# Patient Record
Sex: MALE | Race: WHITE | NOT HISPANIC OR LATINO | Employment: OTHER | URBAN - METROPOLITAN AREA
[De-identification: names, ages, dates, MRNs, and addresses within clinical notes are randomized per-mention and may not be internally consistent; named-entity substitution may affect disease eponyms.]

---

## 2020-10-07 ENCOUNTER — TRANSCRIBE ORDERS (OUTPATIENT)
Dept: ADMINISTRATIVE | Facility: HOSPITAL | Age: 55
End: 2020-10-07

## 2020-10-07 DIAGNOSIS — G47.30 SLEEP APNEA: Primary | ICD-10-CM

## 2020-10-14 ENCOUNTER — TRANSCRIBE ORDERS (OUTPATIENT)
Dept: ADMINISTRATIVE | Facility: HOSPITAL | Age: 55
End: 2020-10-14

## 2020-10-14 DIAGNOSIS — R06.81 APNEA: Primary | ICD-10-CM

## 2022-02-22 ENCOUNTER — OFFICE VISIT (OUTPATIENT)
Dept: INTERNAL MEDICINE CLINIC | Facility: OTHER | Age: 57
End: 2022-02-22
Payer: COMMERCIAL

## 2022-02-22 VITALS
HEART RATE: 68 BPM | DIASTOLIC BLOOD PRESSURE: 76 MMHG | RESPIRATION RATE: 18 BRPM | WEIGHT: 184 LBS | SYSTOLIC BLOOD PRESSURE: 116 MMHG | TEMPERATURE: 98.7 F | HEIGHT: 68 IN | BODY MASS INDEX: 27.89 KG/M2 | OXYGEN SATURATION: 98 %

## 2022-02-22 DIAGNOSIS — I10 ESSENTIAL HYPERTENSION: ICD-10-CM

## 2022-02-22 DIAGNOSIS — Z12.5 PROSTATE CANCER SCREENING: ICD-10-CM

## 2022-02-22 DIAGNOSIS — Z11.59 NEED FOR HEPATITIS C SCREENING TEST: Primary | ICD-10-CM

## 2022-02-22 DIAGNOSIS — K21.9 GASTROESOPHAGEAL REFLUX DISEASE WITHOUT ESOPHAGITIS: ICD-10-CM

## 2022-02-22 DIAGNOSIS — F51.01 PRIMARY INSOMNIA: ICD-10-CM

## 2022-02-22 PROCEDURE — 99204 OFFICE O/P NEW MOD 45 MIN: CPT | Performed by: INTERNAL MEDICINE

## 2022-02-22 RX ORDER — LOSARTAN POTASSIUM AND HYDROCHLOROTHIAZIDE 12.5; 5 MG/1; MG/1
1 TABLET ORAL DAILY
COMMUNITY

## 2022-02-22 NOTE — PROGRESS NOTES
Assessment/Plan:    Essential hypertension  Controlled  Continue losartan-hydrochlorothiazide 50-12 5 mg daily  Gastroesophageal reflux disease without esophagitis  Continue follow-up with Gastroenterology  Primary insomnia  Controlled  Continue current regimen  Diagnoses and all orders for this visit:    Need for hepatitis C screening test  -     Hepatitis C antibody; Future    Prostate cancer screening  -     PSA, Total Screen; Future    Essential hypertension  -     CBC; Future  -     Comprehensive metabolic panel; Future  -     Lipid panel; Future    Gastroesophageal reflux disease without esophagitis    Primary insomnia    Other orders  -     losartan-hydrochlorothiazide (HYZAAR) 50-12 5 mg per tablet; Take 1 tablet by mouth daily  -     patient supplied medication; Take 7 5 each by mouth daily at bedtime Zopiclona ( Syrian sleeping pill, can't get in [place] )          BMI Counseling: Body mass index is 28 18 kg/m²  The BMI is above normal  Nutrition recommendations include decreasing portion sizes, encouraging healthy choices of fruits and vegetables, decreasing fast food intake, consuming healthier snacks, limiting drinks that contain sugar, moderation in carbohydrate intake, increasing intake of lean protein, reducing intake of saturated and trans fat and reducing intake of cholesterol  Exercise recommendations include moderate physical activity 150 minutes/week and exercising 3-5 times per week  No pharmacotherapy was ordered  Patient referred to PCP  Rationale for BMI follow-up plan is due to patient being overweight or obese  Depression Screening and Follow-up Plan: Patient was screened for depression during today's encounter  They screened negative with a PHQ-2 score of 0  Subjective:      Patient ID: Jerardo Ro is a 60 y o  male      Chief Complaint   Patient presents with   [facility] maintenance     hep c, phq, bmi adult, bmi f/u plan annual exam       60 year old male is seen today to establish care  He has a PMHx of HTN, GERD, insomnia, and SAMMI  He recently moved from [facility]  He is currently seeing a gastroenterologist for GERD to which he is scheduled to undergo EGD  He had SAMMI however that has resolved  He has a FH pancreatic cancer (mother)  Heartburn  He complains of heartburn  He reports no abdominal pain, no chest pain, no choking, no coughing, no nausea, no sore throat or no wheezing  This is a chronic problem  The current episode started more than 1 year ago  The problem occurs constantly  The problem has been unchanged  Pertinent negatives include no fatigue  He has tried a PPI for the symptoms  The treatment provided moderate relief  Hypertension  This is a chronic problem  The current episode started more than 1 year ago  The problem is unchanged  Pertinent negatives include no chest pain, headaches, palpitations or shortness of breath  Past treatments include diuretics and angiotensin blockers  The current treatment provides moderate improvement  There are no compliance problems  The following portions of the patient's history were reviewed and updated as appropriate: allergies, current medications, past family history, past medical history, past social history, past surgical history and problem list     Review of Systems   Constitutional: Negative for activity change, appetite change, chills, diaphoresis, fatigue and fever  HENT: Negative for congestion, postnasal drip, rhinorrhea, sinus pressure, sinus pain, sneezing and sore throat  Eyes: Negative for visual disturbance  Respiratory: Negative for apnea, cough, choking, chest tightness, shortness of breath and wheezing  Cardiovascular: Negative for chest pain, palpitations and leg swelling  Gastrointestinal: Positive for heartburn  Negative for abdominal distention, abdominal pain, anal bleeding, blood in stool, constipation, diarrhea, nausea and vomiting  Endocrine: Negative for cold intolerance and heat intolerance  Genitourinary: Negative for difficulty urinating, dysuria and hematuria  Musculoskeletal: Negative  Skin: Negative  Neurological: Negative for dizziness, weakness, light-headedness, numbness and headaches  Hematological: Negative for adenopathy  Psychiatric/Behavioral: Negative for agitation, sleep disturbance and suicidal ideas  All other systems reviewed and are negative  Past Medical History:   Diagnosis Date    Hypertension     Sleep apnea          Current Outpatient Medications:     losartan-hydrochlorothiazide (HYZAAR) 50-12 5 mg per tablet, Take 1 tablet by mouth daily, Disp: , Rfl:     patient supplied medication, Take 7 5 each by mouth daily at bedtime Zopiclona ( [place] sleeping pill, can't get in [place] ), Disp: , Rfl:     No Known Allergies    Social History   History reviewed  No pertinent surgical history  Family History   Problem Relation Age of Onset    Pancreatic cancer Mother 58    Diabetes Mother     Heart attack Father 68    Hypertension Father     Hypertension Sister        Objective:  /76 (BP Location: Left arm, Patient Position: Sitting, Cuff Size: Large)   Pulse 68   Temp 98 7 °F (37 1 °C) (Temporal)   Resp 18   Ht 5' 7 75" (1 721 m)   Wt 83 5 kg (184 lb)   SpO2 98%   BMI 28 18 kg/m²     No results found for this or any previous visit (from the past 1344 hour(s))  Physical Exam  Vitals and nursing note reviewed  Constitutional:       General: He is not in acute distress  Appearance: He is well-developed  He is not diaphoretic  HENT:      Head: Normocephalic and atraumatic  Eyes:      General: No scleral icterus  Right eye: No discharge  Left eye: No discharge  Conjunctiva/sclera: Conjunctivae normal       Pupils: Pupils are equal, round, and reactive to light  Neck:      Thyroid: No thyromegaly  Vascular: No JVD     Cardiovascular:      Rate and Rhythm: Normal rate and regular rhythm  Heart sounds: Normal heart sounds  No murmur heard  No friction rub  No gallop  Pulmonary:      Effort: Pulmonary effort is normal  No respiratory distress  Breath sounds: Normal breath sounds  No wheezing or rales  Chest:      Chest wall: No tenderness  Abdominal:      General: Bowel sounds are normal  There is no distension  Palpations: Abdomen is soft  There is no mass  Tenderness: There is no abdominal tenderness  There is no guarding or rebound  Musculoskeletal:         General: No tenderness or deformity  Normal range of motion  Cervical back: Normal range of motion and neck supple  Lymphadenopathy:      Cervical: No cervical adenopathy  Skin:     General: Skin is warm and dry  Coloration: Skin is not pale  Findings: No erythema or rash  Neurological:      Mental Status: He is alert and oriented to person, place, and time  Cranial Nerves: No cranial nerve deficit  Coordination: Coordination normal       Deep Tendon Reflexes: Reflexes are normal and symmetric  Psychiatric:         Behavior: Behavior normal          Thought Content:  Thought content normal          Judgment: Judgment normal

## 2022-03-07 ENCOUNTER — TELEPHONE (OUTPATIENT)
Dept: INTERNAL MEDICINE CLINIC | Facility: OTHER | Age: 57
End: 2022-03-07

## 2022-03-07 DIAGNOSIS — F51.01 PRIMARY INSOMNIA: ICD-10-CM

## 2022-03-07 DIAGNOSIS — I10 ESSENTIAL HYPERTENSION: Primary | ICD-10-CM

## 2022-03-07 RX ORDER — TRAZODONE HYDROCHLORIDE 50 MG/1
50 TABLET ORAL
Qty: 30 TABLET | Refills: 0 | Status: SHIPPED | OUTPATIENT
Start: 2022-03-07

## 2022-03-07 NOTE — TELEPHONE ENCOUNTER
Patient is asking for the sleep medication you discussed at his last visit  Please let me know when sent to: [facility] in [facility] so I can contact the patient

## 2022-03-18 ENCOUNTER — ANESTHESIA EVENT (OUTPATIENT)
Dept: GASTROENTEROLOGY | Facility: HOSPITAL | Age: 57
End: 2022-03-18

## 2022-03-18 ENCOUNTER — ANESTHESIA (OUTPATIENT)
Dept: GASTROENTEROLOGY | Facility: HOSPITAL | Age: 57
End: 2022-03-18

## 2022-03-18 ENCOUNTER — HOSPITAL ENCOUNTER (OUTPATIENT)
Dept: GASTROENTEROLOGY | Facility: HOSPITAL | Age: 57
Setting detail: OUTPATIENT SURGERY
Discharge: HOME | End: 2022-03-18
Attending: INTERNAL MEDICINE
Payer: COMMERCIAL

## 2022-03-18 VITALS
TEMPERATURE: 96.6 F | SYSTOLIC BLOOD PRESSURE: 111 MMHG | OXYGEN SATURATION: 96 % | HEART RATE: 72 BPM | RESPIRATION RATE: 16 BRPM | DIASTOLIC BLOOD PRESSURE: 72 MMHG

## 2022-03-18 DIAGNOSIS — K21.9 GASTRO-ESOPHAGEAL REFLUX DISEASE WITHOUT ESOPHAGITIS: ICD-10-CM

## 2022-03-18 DIAGNOSIS — Z12.11 ENCOUNTER FOR SCREENING FOR MALIGNANT NEOPLASM OF COLON: ICD-10-CM

## 2022-03-18 PROBLEM — G47.30 SLEEP APNEA: Status: ACTIVE | Noted: 2022-03-18

## 2022-03-18 PROCEDURE — 88341 IMHCHEM/IMCYTCHM EA ADD ANTB: CPT | Performed by: PATHOLOGY

## 2022-03-18 PROCEDURE — 88305 TISSUE EXAM BY PATHOLOGIST: CPT | Performed by: PATHOLOGY

## 2022-03-18 PROCEDURE — 88342 IMHCHEM/IMCYTCHM 1ST ANTB: CPT | Performed by: PATHOLOGY

## 2022-03-18 RX ORDER — LIDOCAINE HYDROCHLORIDE 10 MG/ML
INJECTION, SOLUTION EPIDURAL; INFILTRATION; INTRACAUDAL; PERINEURAL AS NEEDED
Status: DISCONTINUED | OUTPATIENT
Start: 2022-03-18 | End: 2022-03-18

## 2022-03-18 RX ORDER — PROPOFOL 10 MG/ML
INJECTION, EMULSION INTRAVENOUS AS NEEDED
Status: DISCONTINUED | OUTPATIENT
Start: 2022-03-18 | End: 2022-03-18

## 2022-03-18 RX ORDER — SODIUM CHLORIDE 9 MG/ML
INJECTION, SOLUTION INTRAVENOUS CONTINUOUS PRN
Status: DISCONTINUED | OUTPATIENT
Start: 2022-03-18 | End: 2022-03-18

## 2022-03-18 RX ADMIN — PROPOFOL 50 MG: 10 INJECTION, EMULSION INTRAVENOUS at 13:28

## 2022-03-18 RX ADMIN — PROPOFOL 50 MG: 10 INJECTION, EMULSION INTRAVENOUS at 13:16

## 2022-03-18 RX ADMIN — LIDOCAINE HYDROCHLORIDE 85 MG: 10 INJECTION, SOLUTION EPIDURAL; INFILTRATION; INTRACAUDAL; PERINEURAL at 13:11

## 2022-03-18 RX ADMIN — PROPOFOL 50 MG: 10 INJECTION, EMULSION INTRAVENOUS at 13:12

## 2022-03-18 RX ADMIN — SODIUM CHLORIDE: 0.9 INJECTION, SOLUTION INTRAVENOUS at 13:06

## 2022-03-18 RX ADMIN — PROPOFOL 100 MG: 10 INJECTION, EMULSION INTRAVENOUS at 13:11

## 2022-03-18 RX ADMIN — PROPOFOL 50 MG: 10 INJECTION, EMULSION INTRAVENOUS at 13:22

## 2022-03-18 NOTE — H&P
History and Physical -  [facility]  Kleber Duckworth 60 y o  male MRN: [id number]                  HPI: Kleber Duckworth is a 60y o  year old male who presents for an EGD and colonoscopy  Indications for EGD:  Gastroesophageal reflux unresponsive to proton pump inhibitors  Indications for colonoscopy:  Screening for colon cancer  REVIEW OF SYSTEMS: Per the HPI, and otherwise unremarkable  Historical Information   Past Medical History:   Diagnosis Date    Hypertension     Sleep apnea      History reviewed  No pertinent surgical history  Social History   Social History     Substance and Sexual Activity   Alcohol Use Yes    Comment: occasional     Social History     Substance and Sexual Activity   Drug Use Never     Social History     Tobacco Use   Smoking Status Never Smoker   Smokeless Tobacco Never Used     Family History   Problem Relation Age of Onset    Pancreatic cancer Mother 58    Diabetes Mother     Heart attack Father 68    Hypertension Father     Hypertension Sister        Meds/Allergies       Current Outpatient Medications:     losartan-hydrochlorothiazide (HYZAAR) 50-12 5 mg per tablet    traZODone (DESYREL) 50 mg tablet    patient supplied medication    No Known Allergies    Objective     /86   Pulse 70   Temp 98 4 °F (36 9 °C) (Tympanic)   Resp 18   SpO2 98%       PHYSICAL EXAM    Gen: NAD  Head: NCAT  CV: RRR  CHEST: Clear  ABD: soft, NT/ND  EXT: no edema      ASSESSMENT/PLAN:  This is a 60y o  year old male here for EGD and colonoscopy, and he is stable and optimized for his procedure

## 2022-03-18 NOTE — ANESTHESIA POSTPROCEDURE EVALUATION
Post-Op Assessment Note    CV Status:  Stable  Pain Score: 0    Pain management: adequate     Mental Status:  Arousable   Hydration Status:  Stable   PONV Controlled:  None   Airway Patency:  Patent      Post Op Vitals Reviewed: Yes      Staff: Anesthesiologist, CRNA         No complications documented      BP 94/51 (03/18/22 1337)    Temp (!) 96 6 °F (35 9 °C) (03/18/22 1337)    Pulse 66 (03/18/22 1337)   Resp 18 (03/18/22 1337)    SpO2 97 % (03/18/22 1337)

## 2022-03-18 NOTE — ANESTHESIA PREPROCEDURE EVALUATION
Procedure:  EGD  COLONOSCOPY    Relevant Problems   CARDIO   (+) Essential hypertension      GI/HEPATIC   (+) Gastroesophageal reflux disease without esophagitis      PULMONARY   (+) Sleep apnea      Other   (+) Primary insomnia        Physical Exam    Airway    Mallampati score: I  TM Distance: >3 FB  Neck ROM: full     Dental   No notable dental hx     Cardiovascular  Cardiovascular exam normal    Pulmonary  Pulmonary exam normal     Other Findings        Anesthesia Plan  ASA Score- 2     Anesthesia Type- IV sedation with anesthesia with ASA Monitors  Additional Monitors:   Airway Plan:           Plan Factors-Exercise tolerance (METS): >4 METS  Chart reviewed  Imaging results reviewed  Existing labs reviewed  Patient summary reviewed  Induction- intravenous  Postoperative Plan-     Informed Consent- Anesthetic plan and risks discussed with patient  I personally reviewed this patient with the CRNA  Discussed and agreed on the Anesthesia Plan with the CRNA  Phillip Lovelace

## 2023-04-03 ENCOUNTER — OFFICE VISIT (OUTPATIENT)
Dept: INTERNAL MEDICINE CLINIC | Facility: OTHER | Age: 58
End: 2023-04-03

## 2023-04-03 ENCOUNTER — TELEPHONE (OUTPATIENT)
Dept: GASTROENTEROLOGY | Facility: MEDICAL CENTER | Age: 58
End: 2023-04-03

## 2023-04-03 VITALS
HEART RATE: 75 BPM | WEIGHT: 180 LBS | DIASTOLIC BLOOD PRESSURE: 84 MMHG | TEMPERATURE: 98 F | BODY MASS INDEX: 27.28 KG/M2 | SYSTOLIC BLOOD PRESSURE: 120 MMHG | OXYGEN SATURATION: 99 % | HEIGHT: 68 IN

## 2023-04-03 DIAGNOSIS — A60.00 GENITAL HERPES SIMPLEX, UNSPECIFIED SITE: ICD-10-CM

## 2023-04-03 DIAGNOSIS — K21.9 GASTROESOPHAGEAL REFLUX DISEASE WITHOUT ESOPHAGITIS: ICD-10-CM

## 2023-04-03 DIAGNOSIS — Z12.5 PROSTATE CANCER SCREENING: ICD-10-CM

## 2023-04-03 DIAGNOSIS — E78.2 MIXED HYPERLIPIDEMIA: ICD-10-CM

## 2023-04-03 DIAGNOSIS — F51.01 PRIMARY INSOMNIA: ICD-10-CM

## 2023-04-03 DIAGNOSIS — I10 ESSENTIAL HYPERTENSION: Primary | ICD-10-CM

## 2023-04-03 RX ORDER — ATORVASTATIN CALCIUM 20 MG/1
20 TABLET, FILM COATED ORAL DAILY
Qty: 90 TABLET | Refills: 1 | Status: SHIPPED | OUTPATIENT
Start: 2023-04-03

## 2023-04-03 RX ORDER — VALACYCLOVIR HYDROCHLORIDE 500 MG/1
500 TABLET, FILM COATED ORAL 2 TIMES DAILY PRN
Qty: 6 TABLET | Refills: 5 | Status: SHIPPED | OUTPATIENT
Start: 2023-04-03 | End: 2023-04-06

## 2023-04-03 RX ORDER — ZOLPIDEM TARTRATE 5 MG/1
5 TABLET ORAL
Qty: 30 TABLET | Refills: 0 | Status: SHIPPED | OUTPATIENT
Start: 2023-04-03

## 2023-04-03 RX ORDER — OMEPRAZOLE 40 MG/1
40 CAPSULE, DELAYED RELEASE ORAL DAILY
COMMUNITY

## 2023-04-03 NOTE — PROGRESS NOTES
Assessment/Plan:    Gastroesophageal reflux disease without esophagitis  Continue follow-up with gastroenterology  Continue omeprazole 40 mg daily  Essential hypertension  Controlled  Continue current antihypertensive regimen: Losartan-hydrochlorothiazide 50-12 5 mg daily  Primary insomnia  Discontinue trazodone due to lack of efficacy and side effects  We will prescribe Ambien 5 mg at bedtime as needed for insomnia  Mixed hyperlipidemia  Discontinue rosuvastatin 40 mg daily and start atorvastatin 20 mg daily  Discussed diet and exercise  Genital herpes simplex  Continue valacyclovir 500 mg twice a day for 3 days as needed for herpes outbreaks  Diagnoses and all orders for this visit:    Essential hypertension    Primary insomnia  -     zolpidem (AMBIEN) 5 mg tablet; Take 1 tablet (5 mg total) by mouth daily at bedtime as needed for sleep    Mixed hyperlipidemia  -     atorvastatin (LIPITOR) 20 mg tablet; Take 1 tablet (20 mg total) by mouth daily  -     CBC  -     Comprehensive metabolic panel  -     Lipid panel    Genital herpes simplex, unspecified site  -     valACYclovir (VALTREX) 500 mg tablet; Take 1 tablet (500 mg total) by mouth 2 (two) times a day as needed (herpes outbreak) for up to 3 days    Prostate cancer screening  -     PSA, Total Screen    Gastroesophageal reflux disease without esophagitis    Other orders  -     omeprazole (PriLOSEC) 40 MG capsule; Take 40 mg by mouth daily        BMI Counseling: Body mass index is 27 37 kg/m²  The BMI is above normal  Nutrition recommendations include encouraging healthy choices of fruits and vegetables, decreasing fast food intake, consuming healthier snacks, limiting drinks that contain sugar, moderation in carbohydrate intake, increasing intake of lean protein, reducing intake of saturated and trans fat and reducing intake of cholesterol   Exercise recommendations include moderate physical activity 150 minutes/week and exercising 3-5 times per week  No pharmacotherapy was ordered  Patient referred to PCP  Rationale for BMI follow-up plan is due to patient being overweight or obese  Depression Screening and Follow-up Plan: Patient was screened for depression during today's encounter  They screened negative with a PHQ-2 score of 0  Subjective:      Patient ID: Perla Garcia is a 59 y o  male  Chief Complaint   Patient presents with   • Follow-up     Follow up appointment  Patient has been prescribed Rosuvastatina 40 mg for elevated Cholesterol  By provider in [facility]  Patient stated he has also been taking Zopiclona to help him sleep        49-year-old male seen today for follow-up of chronic conditions  Laboratory studies to be completed  He was given trazodone for insomnia however he felt nauseous and drowsiness the following morning  He stopped taking trazodone and restarted Zopiclona  He underwent EGD and colonoscopy by gastroenterology and is being referred to another gastroenterologist to undergo a noninvasive procedure regarding GERD  He has been compliant with omeprazole  He has recurrent gential herpes, occurring approximately twice a year  He takes Valtrex 500 mg daily for 3 days and his symptoms subside  He has no other complaints or concerns at this time  Heartburn  He reports no abdominal pain, no chest pain, no choking, no coughing, no nausea, no sore throat or no wheezing  This is a chronic problem  The current episode started more than 1 year ago  The problem occurs frequently  The problem has been unchanged  The symptoms are aggravated by certain foods  Pertinent negatives include no fatigue  He has tried a PPI for the symptoms  The treatment provided mild relief         The following portions of the patient's history were reviewed and updated as appropriate: allergies, current medications, past family history, past medical history, past social history, past surgical history and problem list     Review of Systems   Constitutional: Negative for activity change, appetite change, chills, diaphoresis, fatigue and fever  HENT: Negative for congestion, postnasal drip, rhinorrhea, sinus pressure, sinus pain, sneezing and sore throat  Eyes: Negative for visual disturbance  Respiratory: Negative for apnea, cough, choking, chest tightness, shortness of breath and wheezing  Cardiovascular: Negative for chest pain, palpitations and leg swelling  Gastrointestinal: Negative for abdominal distention, abdominal pain, anal bleeding, blood in stool, constipation, diarrhea, nausea and vomiting  Endocrine: Negative for cold intolerance and heat intolerance  Genitourinary: Negative for difficulty urinating, dysuria and hematuria  Musculoskeletal: Negative  Skin: Negative  Neurological: Negative for dizziness, weakness, light-headedness, numbness and headaches  Hematological: Negative for adenopathy  Psychiatric/Behavioral: Negative for agitation, sleep disturbance and suicidal ideas  All other systems reviewed and are negative          Past Medical History:   Diagnosis Date   • Hypertension    • Sleep apnea          Current Outpatient Medications:   •  atorvastatin (LIPITOR) 20 mg tablet, Take 1 tablet (20 mg total) by mouth daily, Disp: 90 tablet, Rfl: 1  •  losartan-hydrochlorothiazide (HYZAAR) 50-12 5 mg per tablet, Take 1 tablet by mouth daily, Disp: , Rfl:   •  omeprazole (PriLOSEC) 40 MG capsule, Take 40 mg by mouth daily, Disp: , Rfl:   •  patient supplied medication, Take 7 5 each by mouth daily at bedtime Zopiclona ( [place]ian sleeping pill, can't get in [place] ), Disp: , Rfl:   •  valACYclovir (VALTREX) 500 mg tablet, Take 1 tablet (500 mg total) by mouth 2 (two) times a day as needed (herpes outbreak) for up to 3 days, Disp: 6 tablet, Rfl: 5  •  zolpidem (AMBIEN) 5 mg tablet, Take 1 tablet (5 mg total) by mouth daily at bedtime as needed for sleep, Disp: 30 tablet, Rfl: 0    No Known "Allergies    Social History   History reviewed  No pertinent surgical history  Family History   Problem Relation Age of Onset   • Pancreatic cancer Mother 58   • Diabetes Mother    • Heart attack Father 68   • Hypertension Father    • Hypertension Sister        Objective:  /84 (BP Location: Left arm, Patient Position: Sitting, Cuff Size: Adult)   Pulse 75   Temp 98 °F (36 7 °C)   Ht 5' 8\" (1 727 m)   Wt 81 6 kg (180 lb)   SpO2 99%   BMI 27 37 kg/m²     No results found for this or any previous visit (from the past 1344 hour(s))  Physical Exam  Vitals and nursing note reviewed  Constitutional:       General: He is not in acute distress  Appearance: He is well-developed  He is not diaphoretic  HENT:      Head: Normocephalic and atraumatic  Eyes:      General: No scleral icterus  Right eye: No discharge  Left eye: No discharge  Conjunctiva/sclera: Conjunctivae normal       Pupils: Pupils are equal, round, and reactive to light  Neck:      Thyroid: No thyromegaly  Vascular: No JVD  Cardiovascular:      Rate and Rhythm: Normal rate and regular rhythm  Heart sounds: Normal heart sounds  No murmur heard  No friction rub  No gallop  Pulmonary:      Effort: Pulmonary effort is normal  No respiratory distress  Breath sounds: Normal breath sounds  No wheezing or rales  Chest:      Chest wall: No tenderness  Abdominal:      General: Bowel sounds are normal  There is no distension  Palpations: Abdomen is soft  There is no mass  Tenderness: There is no abdominal tenderness  There is no guarding or rebound  Musculoskeletal:         General: No tenderness or deformity  Normal range of motion  Cervical back: Normal range of motion and neck supple  Lymphadenopathy:      Cervical: No cervical adenopathy  Skin:     General: Skin is warm and dry  Coloration: Skin is not pale  Findings: No erythema or rash     Neurological:      " Mental Status: He is alert and oriented to person, place, and time  Cranial Nerves: No cranial nerve deficit  Coordination: Coordination normal       Deep Tendon Reflexes: Reflexes are normal and symmetric  Psychiatric:         Behavior: Behavior normal          Thought Content:  Thought content normal          Judgment: Judgment normal

## 2023-04-03 NOTE — ASSESSMENT & PLAN NOTE
Discontinue rosuvastatin 40 mg daily and start atorvastatin 20 mg daily  Discussed diet and exercise

## 2023-04-03 NOTE — ASSESSMENT & PLAN NOTE
Discontinue trazodone due to lack of efficacy and side effects  We will prescribe Ambien 5 mg at bedtime as needed for insomnia

## 2023-04-03 NOTE — TELEPHONE ENCOUNTER
Dr Razia Hayes would like to know if patient can come in today at 2:20   Please let us know patient's response  LVM  Thank you

## 2023-04-03 NOTE — ASSESSMENT & PLAN NOTE
Controlled  Continue current antihypertensive regimen: Losartan-hydrochlorothiazide 50-12 5 mg daily

## 2023-04-04 NOTE — TELEPHONE ENCOUNTER
Ovi Caceres,  Is there another date soon that we can squeeze the patient in  He is stating the pain is rating at 10 and affecting his quality of life and does not believe he can wait till August  Please advise  Thank you

## 2023-04-04 NOTE — TELEPHONE ENCOUNTER
Patient returned your call to schedule  He states that he can only receive VM, if you leave a message with a day and time he will be there

## 2023-04-06 ENCOUNTER — APPOINTMENT (OUTPATIENT)
Dept: LAB | Facility: IMAGING CENTER | Age: 58
End: 2023-04-06

## 2023-04-06 LAB
ALBUMIN SERPL BCP-MCNC: 4 G/DL (ref 3.5–5)
ALP SERPL-CCNC: 40 U/L (ref 46–116)
ALT SERPL W P-5'-P-CCNC: 40 U/L (ref 12–78)
ANION GAP SERPL CALCULATED.3IONS-SCNC: 4 MMOL/L (ref 4–13)
AST SERPL W P-5'-P-CCNC: 24 U/L (ref 5–45)
BILIRUB SERPL-MCNC: 0.66 MG/DL (ref 0.2–1)
BUN SERPL-MCNC: 16 MG/DL (ref 5–25)
CALCIUM SERPL-MCNC: 9.1 MG/DL (ref 8.3–10.1)
CHLORIDE SERPL-SCNC: 109 MMOL/L (ref 96–108)
CHOLEST SERPL-MCNC: 156 MG/DL
CO2 SERPL-SCNC: 26 MMOL/L (ref 21–32)
CREAT SERPL-MCNC: 1.2 MG/DL (ref 0.6–1.3)
ERYTHROCYTE [DISTWIDTH] IN BLOOD BY AUTOMATED COUNT: 12.8 % (ref 11.6–15.1)
GFR SERPL CREATININE-BSD FRML MDRD: 65 ML/MIN/1.73SQ M
GLUCOSE P FAST SERPL-MCNC: 126 MG/DL (ref 65–99)
HCT VFR BLD AUTO: 44.9 % (ref 36.5–49.3)
HDLC SERPL-MCNC: 66 MG/DL
HGB BLD-MCNC: 14.5 G/DL (ref 12–17)
LDLC SERPL CALC-MCNC: 76 MG/DL (ref 0–100)
MCH RBC QN AUTO: 28.4 PG (ref 26.8–34.3)
MCHC RBC AUTO-ENTMCNC: 32.3 G/DL (ref 31.4–37.4)
MCV RBC AUTO: 88 FL (ref 82–98)
NONHDLC SERPL-MCNC: 90 MG/DL
PLATELET # BLD AUTO: 242 THOUSANDS/UL (ref 149–390)
PMV BLD AUTO: 12.1 FL (ref 8.9–12.7)
POTASSIUM SERPL-SCNC: 3.6 MMOL/L (ref 3.5–5.3)
PROT SERPL-MCNC: 6.5 G/DL (ref 6.4–8.4)
PSA SERPL-MCNC: 0.9 NG/ML (ref 0–4)
RBC # BLD AUTO: 5.1 MILLION/UL (ref 3.88–5.62)
SODIUM SERPL-SCNC: 139 MMOL/L (ref 135–147)
TRIGL SERPL-MCNC: 69 MG/DL
WBC # BLD AUTO: 6.16 THOUSAND/UL (ref 4.31–10.16)

## 2023-04-06 NOTE — TELEPHONE ENCOUNTER
Pt returning call requesting to speak with Marielos regarding availability w/ Dr Pastor Das  Limited availability with Dr Pastor Das at this time  Pt can be reached at [phone]

## 2023-04-17 PROBLEM — Z80.0 FAMILY HISTORY OF PANCREATIC CANCER: Status: ACTIVE | Noted: 2023-04-17

## 2023-04-17 PROBLEM — R14.0 GASSINESS: Status: ACTIVE | Noted: 2023-04-17

## 2023-04-17 PROBLEM — R14.0 BLOATING: Status: ACTIVE | Noted: 2023-04-17

## 2023-04-20 DIAGNOSIS — Z80.0 FAMILY HISTORY OF PANCREATIC CANCER: Primary | ICD-10-CM

## 2023-04-20 DIAGNOSIS — Z91.041 RADIOGRAPHIC DYE ALLERGY STATUS: ICD-10-CM

## 2023-04-20 PROBLEM — R14.0 BLOATING: Status: RESOLVED | Noted: 2023-04-17 | Resolved: 2023-04-20

## 2023-04-20 PROBLEM — R73.01 ELEVATED FASTING GLUCOSE: Status: ACTIVE | Noted: 2023-04-20

## 2023-04-20 PROBLEM — R14.0 GASSINESS: Status: RESOLVED | Noted: 2023-04-17 | Resolved: 2023-04-20

## 2023-04-20 RX ORDER — METHYLPREDNISOLONE 32 MG/1
32 TABLET ORAL DAILY
Qty: 2 TABLET | Refills: 0 | Status: SHIPPED | OUTPATIENT
Start: 2023-04-20 | End: 2023-04-22

## 2023-04-20 RX ORDER — DIPHENHYDRAMINE HCL 50 MG
50 CAPSULE ORAL ONCE
Qty: 1 CAPSULE | Refills: 0 | Status: SHIPPED | OUTPATIENT
Start: 2023-04-20 | End: 2023-04-20

## 2023-04-24 ENCOUNTER — HOSPITAL ENCOUNTER (OUTPATIENT)
Dept: CT IMAGING | Facility: HOSPITAL | Age: 58
Discharge: HOME | End: 2023-04-24
Attending: INTERNAL MEDICINE

## 2023-04-24 DIAGNOSIS — R14.0 BLOATING: ICD-10-CM

## 2023-04-24 DIAGNOSIS — Z80.0 FAMILY HISTORY OF PANCREATIC CANCER: ICD-10-CM

## 2023-04-24 RX ADMIN — IOHEXOL 35 ML: 300 INJECTION, SOLUTION INTRAVENOUS at 19:49

## 2023-04-24 RX ADMIN — IOHEXOL 100 ML: 350 INJECTION, SOLUTION INTRAVENOUS at 19:49

## 2023-04-26 ENCOUNTER — HOSPITAL ENCOUNTER (OUTPATIENT)
Dept: GASTROENTEROLOGY | Facility: HOSPITAL | Age: 58
Discharge: HOME | End: 2023-04-26
Attending: INTERNAL MEDICINE

## 2023-04-26 VITALS
DIASTOLIC BLOOD PRESSURE: 93 MMHG | HEART RATE: 56 BPM | RESPIRATION RATE: 16 BRPM | OXYGEN SATURATION: 100 % | TEMPERATURE: 97.5 F | SYSTOLIC BLOOD PRESSURE: 143 MMHG

## 2023-04-26 DIAGNOSIS — K21.9 GASTROESOPHAGEAL REFLUX DISEASE WITHOUT ESOPHAGITIS: ICD-10-CM

## 2023-04-26 NOTE — PERIOPERATIVE NURSING NOTE
Patient brought in the room and explained the esophageal manometry procedure  After the confirmation of allergies, lidocaine 2% inserted via right /left nostril and  a transnasal insertion of the High Resolution esophageal manometry catheter was inserted via left nostril  Patient given water to drink during the insertion and once the catheter inserted pressure bands of both Upper esophageal sphincter  (UES) and Lower esophageal sphincter ( LES) were observed on the color contour  Patient instructed to take a deep breath to verify placement of the catheter, diaphragmatic pinch noted on inspiration  Catheter was secured to left cheek  Patient was assisted to supine position   Patient was instructed to relax  while acclimating the catheter for about 5 minutes  A 30 second baseline resting pressure was obtained to identify the UES and LES followed by a series of 10 liquid swallows using 5 cc room temperature normal saline to assess esophageal motility and bolus transit  No 10 viscous swallows using 5 cc viscous solution, 1 multiple rapid drink swallow using 2 cc room temperature normal saline given a total of 5 drinks  Patient instructed to sit up at the edge of the stretcher and given 5 upright liquid swallows using 5 cc room temperature normal saline and 1 rapid drink challenge using 200 cc room temperature water  At the end of the procedure the high resolution esophageal manometry catheter was removed from the nostril intact  sensor PH probe inserted via left nostril and secured  Zephr recorder teachback performed and patient verbalized understanding  Patient instructed to return next day to have probe remove  Discharge instructions given and patient ambulated out of room in stable condition

## 2023-04-26 NOTE — RESULT ENCOUNTER NOTE
Inform patient via [employer]  Please review the pathology/lab result of further discussion  Copied from [employer] message :       [id number],     Your CAT scan was unremarkable    The pancreas looked normal     Best regards,     Abi Coello MD

## 2023-05-14 DIAGNOSIS — K21.9 GASTROESOPHAGEAL REFLUX DISEASE WITHOUT ESOPHAGITIS: ICD-10-CM

## 2023-05-14 RX ORDER — FAMOTIDINE 40 MG/1
TABLET, FILM COATED ORAL
Qty: 90 TABLET | Refills: 1 | Status: SHIPPED | OUTPATIENT
Start: 2023-05-14

## 2023-08-21 ENCOUNTER — OFFICE VISIT (OUTPATIENT)
Dept: INTERNAL MEDICINE CLINIC | Facility: OTHER | Age: 58
End: 2023-08-21
Payer: COMMERCIAL

## 2023-08-21 VITALS
HEART RATE: 49 BPM | DIASTOLIC BLOOD PRESSURE: 82 MMHG | WEIGHT: 161.6 LBS | RESPIRATION RATE: 18 BRPM | HEIGHT: 68 IN | SYSTOLIC BLOOD PRESSURE: 128 MMHG | TEMPERATURE: 97.5 F | BODY MASS INDEX: 24.49 KG/M2 | OXYGEN SATURATION: 99 %

## 2023-08-21 DIAGNOSIS — R73.01 ELEVATED FASTING GLUCOSE: Primary | ICD-10-CM

## 2023-08-21 DIAGNOSIS — Z11.59 ENCOUNTER FOR HEPATITIS C SCREENING TEST FOR LOW RISK PATIENT: ICD-10-CM

## 2023-08-21 DIAGNOSIS — Z11.4 ENCOUNTER FOR SCREENING FOR HIV: ICD-10-CM

## 2023-08-21 DIAGNOSIS — A60.00 GENITAL HERPES SIMPLEX, UNSPECIFIED SITE: ICD-10-CM

## 2023-08-21 DIAGNOSIS — F51.01 PRIMARY INSOMNIA: ICD-10-CM

## 2023-08-21 DIAGNOSIS — E78.2 MIXED HYPERLIPIDEMIA: ICD-10-CM

## 2023-08-21 PROCEDURE — 99213 OFFICE O/P EST LOW 20 MIN: CPT | Performed by: STUDENT IN AN ORGANIZED HEALTH CARE EDUCATION/TRAINING PROGRAM

## 2023-08-21 RX ORDER — ATORVASTATIN CALCIUM 20 MG/1
20 TABLET, FILM COATED ORAL DAILY
Qty: 120 TABLET | Refills: 1 | Status: SHIPPED | OUTPATIENT
Start: 2023-08-21

## 2023-08-21 RX ORDER — ZOLPIDEM TARTRATE 5 MG/1
5 TABLET ORAL
Qty: 30 TABLET | Refills: 0 | Status: SHIPPED | OUTPATIENT
Start: 2023-08-21

## 2023-08-21 NOTE — PROGRESS NOTES
[address] [place] [address]  INTERNAL MEDICINE OFFICE VISIT     PATIENT INFORMATION     Dary Corrigan   59 y.o. male   MRN: [id number]    ASSESSMENT/PLAN     Diagnoses and all orders for this visit:    Elevated fasting glucose  Fasting glucose 126 on blood work in April. A1c ordered previously, not yet done. Patient has been checking his sugars at home with range of . · Check A1c  · Discussed with patient that at this time, he does not need to be checking his BS    Mixed hyperlipidemia  Last lipid panel in March /TG 69/HDL 66/LDL 76. Has been out of his atorvastatin for 1 month. · Refill, continue atorvastatin 20 mg qd  -     atorvastatin (LIPITOR) 20 mg tablet; Take 1 tablet (20 mg total) by mouth daily    Primary insomnia  Patient takes Ambien nightly. He was also previously taking a sleeping pill from [place]. Has tried melatonin in the past with no benefit. · PDMP reviewed  · Attending physician to refill Ambien  · Encouraged patient only to take prescription sleep aid and to avoid concomitant use  -     zolpidem (AMBIEN) 5 mg tablet; Take 1 tablet (5 mg total) by mouth daily at bedtime as needed for sleep    Genital herpes simplex, unspecified site  Last herpes flare in January, reports 2 per year. · Refills of Valtrex available at pharmacy    Encounter for hepatitis C screening test for low risk patient  -     Hepatitis C antibody; Future    Encounter for screening for HIV  -     HIV 1/2 AG/AB w [facility] for 2 yr old and above; Future      Schedule a follow-up appointment in 6 months. HEALTH MAINTENANCE     There is no immunization history on file for this patient. CHIEF COMPLAINT     Chief Complaint   Patient presents with   • Follow-up     Wants to discuss blood sugars ( would like A1C rechecked )and kidneys ( wants to get another EGFR done )- labs last done in April. There is active labs in chart.    •      annual   • Hypertension     States he has a blood pressure medication but has not been taking it because his pressures have been good      HISTORY OF PRESENT ILLNESS     Dary Corrigan is a 60 yo M with PMH of HLD, HTN, insomnia who presents today to follow up on his glucose and kidney function. In April, his lab work showed a fasting glucose of 126, Cr of 1.20, and eGFR of 65. He was ordered A1C and CMP but unfortunately, did not have these done prior to this appointment. He has been checking his sugar daily and reports range from 86 to 106. He has mother and aunt with diabetes and father with renal disease requiring dialysis, so he would like to be on top of these issues. He ran out of his atorvastatin 1 month ago. He is going to [facility] next month and will be gone for about 2 months, so he is asking for refills of his atorvastatin, valtrex, and ambien (which he uses nightly). He has refills of valtrex available at the pharmacy. He has also been using a sleeping pill that he got from [facility]. It was encouraged that he only stick to prescription sleep aid and not to use this pill in combination of Ambien. REVIEW OF SYSTEMS     Review of Systems   Constitutional: Negative for chills and fever. HENT: Negative for sore throat. Respiratory: Negative for cough and shortness of breath. Cardiovascular: Negative for chest pain and palpitations. Gastrointestinal: Negative for abdominal pain, diarrhea and nausea. Genitourinary: Negative for dysuria. Skin: Negative for rash. Neurological: Negative for dizziness, light-headedness and headaches. Psychiatric/Behavioral: Positive for sleep disturbance. OBJECTIVE     Vitals:    08/21/23 1259   BP: 128/82   BP Location: Left arm   Patient Position: Sitting   Cuff Size: Standard   Pulse: (!) 49   Resp: 18   Temp: 97.5 °F (36.4 °C)   TempSrc: Temporal   SpO2: 99%   Weight: 73.3 kg (161 lb 9.6 oz)   Height: 5' 8" (1.727 m)     Physical Exam  Constitutional:       General: He is not in acute distress.   HENT:      Mouth/Throat:      Mouth: Mucous membranes are moist.   Eyes:      Conjunctiva/sclera: Conjunctivae normal.   Cardiovascular:      Rate and Rhythm: Regular rhythm. Bradycardia present. Heart sounds: Normal heart sounds. Pulmonary:      Effort: Pulmonary effort is normal.      Breath sounds: Normal breath sounds. Abdominal:      General: There is no distension. Tenderness: There is no abdominal tenderness. Musculoskeletal:      Cervical back: Neck supple. Right lower leg: No edema. Left lower leg: No edema. Skin:     General: Skin is warm and dry. Neurological:      Mental Status: He is alert. Psychiatric:         Speech: Speech normal.         Behavior: Behavior normal. Behavior is cooperative. CURRENT MEDICATIONS     Current Outpatient Medications:   •  atorvastatin (LIPITOR) 20 mg tablet, Take 1 tablet (20 mg total) by mouth daily, Disp: 120 tablet, Rfl: 1  •  patient supplied medication, Take 7.5 each by mouth daily at bedtime Zopiclona ( Nepali sleeping pill, can't get in [place] ), Disp: , Rfl:   •  valACYclovir (VALTREX) 500 mg tablet, Take 1 tablet (500 mg total) by mouth 2 (two) times a day as needed (herpes outbreak) for up to 3 days, Disp: 6 tablet, Rfl: 5  •  zolpidem (AMBIEN) 5 mg tablet, Take 1 tablet (5 mg total) by mouth daily at bedtime as needed for sleep, Disp: 30 tablet, Rfl: 0  •  diphenhydrAMINE (BENADRYL) 50 mg capsule, Take 1 capsule (50 mg total) by mouth 1 (one) time for 1 dose Please take it 2 hours beofre the CT scan, Disp: 1 capsule, Rfl: 0    PAST MEDICAL & SURGICAL HISTORY     Past Medical History:   Diagnosis Date   • Hypertension    • Sleep apnea      History reviewed. No pertinent surgical history.   SOCIAL & FAMILY HISTORY     Social History     Socioeconomic History   • Marital status: Single     Spouse name: Not on file   • Number of children: Not on file   • Years of education: Not on file   • Highest education level: Not on file   Occupational History   • Not on file Tobacco Use   • Smoking status: Never   • Smokeless tobacco: Never   Vaping Use   • Vaping Use: Never used   Substance and Sexual Activity   • Alcohol use: Yes     Comment: occasional   • Drug use: Never   • Sexual activity: Not on file   Other Topics Concern   • Not on file   Social History Narrative   • Not on file     Social Determinants of Health     Financial Resource Strain: Not on file   Food Insecurity: Not on file   Transportation Needs: Not on file   Physical Activity: Not on file   Stress: Not on file   Social Connections: Not on file   Intimate Partner Violence: Not on file   Housing Stability: Not on file     Social History     Substance and Sexual Activity   Alcohol Use Yes    Comment: occasional     Substance and Sexual Activity   Alcohol Use Yes    Comment: occasional        Substance and Sexual Activity   Drug Use Never     Social History     Tobacco Use   Smoking Status Never   Smokeless Tobacco Never     Family History   Problem Relation Age of Onset   • Pancreatic cancer Mother 58   • Diabetes Mother    • Heart attack Father 68   • Hypertension Father    • Hypertension Sister              ==  Glory Dang MD PGY3  [facility] Internal Medicine Residency

## 2023-08-22 ENCOUNTER — APPOINTMENT (OUTPATIENT)
Dept: LAB | Facility: IMAGING CENTER | Age: 58
End: 2023-08-22
Payer: COMMERCIAL

## 2023-08-22 DIAGNOSIS — R73.01 ELEVATED FASTING GLUCOSE: ICD-10-CM

## 2023-08-22 DIAGNOSIS — Z11.59 ENCOUNTER FOR HEPATITIS C SCREENING TEST FOR LOW RISK PATIENT: ICD-10-CM

## 2023-08-22 DIAGNOSIS — I10 ESSENTIAL HYPERTENSION: ICD-10-CM

## 2023-08-22 DIAGNOSIS — Z11.4 ENCOUNTER FOR SCREENING FOR HIV: ICD-10-CM

## 2023-08-22 LAB
ALBUMIN SERPL BCP-MCNC: 4.2 G/DL (ref 3.5–5)
ALP SERPL-CCNC: 42 U/L (ref 34–104)
ALT SERPL W P-5'-P-CCNC: 18 U/L (ref 7–52)
ANION GAP SERPL CALCULATED.3IONS-SCNC: 5 MMOL/L
AST SERPL W P-5'-P-CCNC: 19 U/L (ref 13–39)
BILIRUB SERPL-MCNC: 0.77 MG/DL (ref 0.2–1)
BUN SERPL-MCNC: 19 MG/DL (ref 5–25)
CALCIUM SERPL-MCNC: 9 MG/DL (ref 8.4–10.2)
CHLORIDE SERPL-SCNC: 108 MMOL/L (ref 96–108)
CO2 SERPL-SCNC: 29 MMOL/L (ref 21–32)
CREAT SERPL-MCNC: 1.13 MG/DL (ref 0.6–1.3)
EST. AVERAGE GLUCOSE BLD GHB EST-MCNC: 94 MG/DL
GFR SERPL CREATININE-BSD FRML MDRD: 70 ML/MIN/1.73SQ M
GLUCOSE P FAST SERPL-MCNC: 88 MG/DL (ref 65–99)
HBA1C MFR BLD: 4.9 %
HCV AB SER QL: NORMAL
HIV 1+2 AB+HIV1 P24 AG SERPL QL IA: NORMAL
HIV 2 AB SERPL QL IA: NORMAL
HIV1 AB SERPL QL IA: NORMAL
HIV1 P24 AG SERPL QL IA: NORMAL
POTASSIUM SERPL-SCNC: 3.9 MMOL/L (ref 3.5–5.3)
PROT SERPL-MCNC: 6.4 G/DL (ref 6.4–8.4)
SODIUM SERPL-SCNC: 142 MMOL/L (ref 135–147)

## 2023-08-22 PROCEDURE — 87389 HIV-1 AG W/HIV-1&-2 AB AG IA: CPT

## 2023-08-22 PROCEDURE — 80053 COMPREHEN METABOLIC PANEL: CPT

## 2023-08-22 PROCEDURE — 83036 HEMOGLOBIN GLYCOSYLATED A1C: CPT

## 2023-08-22 PROCEDURE — 36415 COLL VENOUS BLD VENIPUNCTURE: CPT

## 2023-08-22 PROCEDURE — 86803 HEPATITIS C AB TEST: CPT

## 2024-05-06 ENCOUNTER — OFFICE VISIT (OUTPATIENT)
Dept: INTERNAL MEDICINE CLINIC | Facility: OTHER | Age: 59
End: 2024-05-06
Payer: COMMERCIAL

## 2024-05-06 VITALS
BODY MASS INDEX: 25.16 KG/M2 | OXYGEN SATURATION: 98 % | HEIGHT: 68 IN | DIASTOLIC BLOOD PRESSURE: 80 MMHG | TEMPERATURE: 97.6 F | WEIGHT: 166 LBS | SYSTOLIC BLOOD PRESSURE: 116 MMHG | HEART RATE: 76 BPM

## 2024-05-06 DIAGNOSIS — F51.01 PRIMARY INSOMNIA: ICD-10-CM

## 2024-05-06 DIAGNOSIS — R07.9 CHEST PAIN, UNSPECIFIED TYPE: ICD-10-CM

## 2024-05-06 DIAGNOSIS — R60.0 BILATERAL LEG EDEMA: ICD-10-CM

## 2024-05-06 DIAGNOSIS — E78.2 MIXED HYPERLIPIDEMIA: Primary | ICD-10-CM

## 2024-05-06 DIAGNOSIS — G47.33 OBSTRUCTIVE SLEEP APNEA SYNDROME: ICD-10-CM

## 2024-05-06 DIAGNOSIS — M25.362 INSTABILITY OF LEFT KNEE JOINT: ICD-10-CM

## 2024-05-06 DIAGNOSIS — Z12.5 PROSTATE CANCER SCREENING: ICD-10-CM

## 2024-05-06 DIAGNOSIS — A60.00 GENITAL HERPES SIMPLEX, UNSPECIFIED SITE: ICD-10-CM

## 2024-05-06 DIAGNOSIS — M25.562 CHRONIC PAIN OF LEFT KNEE: ICD-10-CM

## 2024-05-06 DIAGNOSIS — G89.29 CHRONIC PAIN OF LEFT KNEE: ICD-10-CM

## 2024-05-06 DIAGNOSIS — I10 ESSENTIAL HYPERTENSION: ICD-10-CM

## 2024-05-06 PROBLEM — K21.9 GASTROESOPHAGEAL REFLUX DISEASE WITHOUT ESOPHAGITIS: Status: RESOLVED | Noted: 2022-02-22 | Resolved: 2024-05-06

## 2024-05-06 PROBLEM — R73.01 ELEVATED FASTING GLUCOSE: Status: RESOLVED | Noted: 2023-04-20 | Resolved: 2024-05-06

## 2024-05-06 PROCEDURE — 93000 ELECTROCARDIOGRAM COMPLETE: CPT | Performed by: INTERNAL MEDICINE

## 2024-05-06 PROCEDURE — 99214 OFFICE O/P EST MOD 30 MIN: CPT | Performed by: INTERNAL MEDICINE

## 2024-05-06 RX ORDER — ATORVASTATIN CALCIUM 20 MG/1
20 TABLET, FILM COATED ORAL DAILY
Qty: 90 TABLET | Refills: 1 | Status: SHIPPED | OUTPATIENT
Start: 2024-05-06

## 2024-05-06 RX ORDER — VALACYCLOVIR HYDROCHLORIDE 500 MG/1
500 TABLET, FILM COATED ORAL 2 TIMES DAILY PRN
Qty: 30 TABLET | Refills: 0 | Status: SHIPPED | OUTPATIENT
Start: 2024-05-06 | End: 2024-07-05

## 2024-05-06 NOTE — PROGRESS NOTES
Assessment/Plan:    Essential hypertension  Stable, controlled.    Sleep apnea  Will refer to sleep medicine.    Genital herpes simplex  Continue valacyclovir 500 mg twice a day for 3 days as needed for outbreaks.    Primary insomnia  Referral to sleep medicine given.    Mixed hyperlipidemia  Continue atorvastatin 20 mg daily.    Instability of left knee joint  Will order an MRI of the left knee for further evaluation and refer to orthopedic surgery.  He has not had any improvement of knee pain or increased stability with home therapy exercises.  He denies any recent injury.  He was evaluated by an orthopedic surgeon in [place] who recommended if symptoms persist to undergo MRI of the left knee.    Chest pain  Point-of-care EKG: Normal sinus rhythm without ST or T wave abnormalities.  Will order a stress echo for further evaluation and a BNP.       Diagnoses and all orders for this visit:    Mixed hyperlipidemia  -     atorvastatin (LIPITOR) 20 mg tablet; Take 1 tablet (20 mg total) by mouth daily  -     CBC; Future  -     Comprehensive metabolic panel; Future  -     Lipid panel; Future    Genital herpes simplex, unspecified site  -     valACYclovir (VALTREX) 500 mg tablet; Take 1 tablet (500 mg total) by mouth 2 (two) times a day as needed (herpes outbreak for 3 days)    Chronic pain of left knee  -     Ambulatory Referral to Orthopedic Surgery; Future  -     MRI knee left  wo contrast; Future    Instability of left knee joint  -     Ambulatory Referral to Orthopedic Surgery; Future  -     MRI knee left  wo contrast; Future    Obstructive sleep apnea syndrome  -     Ambulatory Referral to Sleep Medicine; Future  -     CBC; Future  -     Comprehensive metabolic panel; Future    Prostate cancer screening  -     PSA, Total Screen; Future    Chest pain, unspecified type  -     POCT ECG  -     B-Type Natriuretic Peptide(BNP); Future  -     Echo stress test, exercise; Future    Bilateral leg edema  -     B-Type  Natriuretic Peptide(BNP); Future  -     Echo stress test, exercise; Future    Essential hypertension    Primary insomnia                  Subjective:      Patient ID: Alex Xiong is a 58 y.o. male.    Chief Complaint   Patient presents with   • Physical Exam       Alex Xiong is seen today for follow up of chronic conditions.   Recent laboratory studies reviewed today with the patient.   he has been compliant with his medication regimen.   He has noticed bilateral lower extremity edema since 7 months. He then started experiencing chest pain since 2 months. He denies lightheadedness and SOB/GUIDO.   He would like a refill of valacyclovir. He reports intermittent herpes outbreaks.   He has chronic left knee pain. He reports instability of the left knee.   He has SAMMI and would like to start CPAP. He has not seen a sleep medicine doctor.   he has no complaints or concerns at this time.       Hyperlipidemia  This is a chronic problem. The current episode started more than 1 year ago. The problem is controlled. Recent lipid tests were reviewed and are normal. Pertinent negatives include no chest pain or shortness of breath. Current antihyperlipidemic treatment includes statins. The current treatment provides moderate improvement of lipids. There are no compliance problems.        The following portions of the patient's history were reviewed and updated as appropriate: allergies, current medications, past family history, past medical history, past social history, past surgical history, and problem list.    Review of Systems   Constitutional:  Negative for activity change, appetite change, chills, diaphoresis, fatigue and fever.   HENT:  Negative for congestion, postnasal drip, rhinorrhea, sinus pressure, sinus pain, sneezing and sore throat.    Eyes:  Negative for visual disturbance.   Respiratory:  Negative for apnea, cough, choking, chest tightness, shortness of breath and wheezing.    Cardiovascular:  Negative for  "chest pain, palpitations and leg swelling.   Gastrointestinal:  Negative for abdominal distention, abdominal pain, anal bleeding, blood in stool, constipation, diarrhea, nausea and vomiting.   Endocrine: Negative for cold intolerance and heat intolerance.   Genitourinary:  Negative for difficulty urinating, dysuria and hematuria.   Musculoskeletal: Negative.    Skin: Negative.    Neurological:  Negative for dizziness, weakness, light-headedness, numbness and headaches.   Hematological:  Negative for adenopathy.   Psychiatric/Behavioral:  Negative for agitation, sleep disturbance and suicidal ideas.    All other systems reviewed and are negative.        Past Medical History:   Diagnosis Date   • GERD (gastroesophageal reflux disease)    • Hypertension    • Sleep apnea          Current Outpatient Medications:   •  atorvastatin (LIPITOR) 20 mg tablet, Take 1 tablet (20 mg total) by mouth daily, Disp: 90 tablet, Rfl: 1  •  valACYclovir (VALTREX) 500 mg tablet, Take 1 tablet (500 mg total) by mouth 2 (two) times a day as needed (herpes outbreak for 3 days), Disp: 30 tablet, Rfl: 0    No Known Allergies    Social History   History reviewed. No pertinent surgical history.  Family History   Problem Relation Age of Onset   • Pancreatic cancer Mother 62   • Diabetes Mother    • Heart attack Father 76   • Hypertension Father    • Hypertension Sister        Objective:  /80 (BP Location: Left arm, Patient Position: Sitting, Cuff Size: Standard)   Pulse 76   Temp 97.6 °F (36.4 °C) (Temporal)   Ht 5' 8\" (1.727 m)   Wt 75.3 kg (166 lb)   SpO2 98%   BMI 25.24 kg/m²     No results found for this or any previous visit (from the past 1344 hour(s)).         Physical Exam  Vitals and nursing note reviewed.   Constitutional:       General: He is not in acute distress.     Appearance: He is well-developed. He is not diaphoretic.   HENT:      Head: Normocephalic and atraumatic.   Eyes:      General: No scleral icterus.        " Right eye: No discharge.         Left eye: No discharge.      Conjunctiva/sclera: Conjunctivae normal.      Pupils: Pupils are equal, round, and reactive to light.   Neck:      Thyroid: No thyromegaly.      Vascular: No JVD.   Cardiovascular:      Rate and Rhythm: Normal rate and regular rhythm.      Heart sounds: Normal heart sounds. No murmur heard.     No friction rub. No gallop.   Pulmonary:      Effort: Pulmonary effort is normal. No respiratory distress.      Breath sounds: Normal breath sounds. No wheezing or rales.   Chest:      Chest wall: No tenderness.   Abdominal:      General: Bowel sounds are normal. There is no distension.      Palpations: Abdomen is soft. There is no mass.      Tenderness: There is no abdominal tenderness. There is no guarding or rebound.   Musculoskeletal:         General: No tenderness or deformity. Normal range of motion.      Cervical back: Normal range of motion and neck supple.   Lymphadenopathy:      Cervical: No cervical adenopathy.   Skin:     General: Skin is warm and dry.      Coloration: Skin is not pale.      Findings: No erythema or rash.   Neurological:      Mental Status: He is alert and oriented to person, place, and time.      Cranial Nerves: No cranial nerve deficit.      Coordination: Coordination normal.      Deep Tendon Reflexes: Reflexes are normal and symmetric.   Psychiatric:         Behavior: Behavior normal.         Thought Content: Thought content normal.         Judgment: Judgment normal.

## 2024-05-06 NOTE — ASSESSMENT & PLAN NOTE
Point-of-care EKG: Normal sinus rhythm without ST or T wave abnormalities.  Will order a stress echo for further evaluation and a BNP.

## 2024-05-06 NOTE — ASSESSMENT & PLAN NOTE
Will order an MRI of the left knee for further evaluation and refer to orthopedic surgery.  He has not had any improvement of knee pain or increased stability with home therapy exercises.  He denies any recent injury.  He was evaluated by an orthopedic surgeon in [place] who recommended if symptoms persist to undergo MRI of the left knee.

## 2024-05-08 ENCOUNTER — APPOINTMENT (OUTPATIENT)
Dept: LAB | Facility: IMAGING CENTER | Age: 59
End: 2024-05-08
Payer: COMMERCIAL

## 2024-05-08 ENCOUNTER — OFFICE VISIT (OUTPATIENT)
Dept: SLEEP CENTER | Facility: CLINIC | Age: 59
End: 2024-05-08

## 2024-05-08 VITALS
HEIGHT: 68 IN | SYSTOLIC BLOOD PRESSURE: 118 MMHG | BODY MASS INDEX: 25.76 KG/M2 | WEIGHT: 170 LBS | DIASTOLIC BLOOD PRESSURE: 79 MMHG

## 2024-05-08 DIAGNOSIS — Z12.5 PROSTATE CANCER SCREENING: ICD-10-CM

## 2024-05-08 DIAGNOSIS — G47.33 OBSTRUCTIVE SLEEP APNEA SYNDROME: ICD-10-CM

## 2024-05-08 DIAGNOSIS — E78.2 MIXED HYPERLIPIDEMIA: ICD-10-CM

## 2024-05-08 DIAGNOSIS — R60.0 BILATERAL LEG EDEMA: ICD-10-CM

## 2024-05-08 DIAGNOSIS — R07.9 CHEST PAIN, UNSPECIFIED TYPE: ICD-10-CM

## 2024-05-08 LAB
ALBUMIN SERPL BCP-MCNC: 4.4 G/DL (ref 3.5–5)
ALP SERPL-CCNC: 42 U/L (ref 34–104)
ALT SERPL W P-5'-P-CCNC: 23 U/L (ref 7–52)
ANION GAP SERPL CALCULATED.3IONS-SCNC: 9 MMOL/L (ref 4–13)
AST SERPL W P-5'-P-CCNC: 30 U/L (ref 13–39)
BILIRUB SERPL-MCNC: 0.75 MG/DL (ref 0.2–1)
BNP SERPL-MCNC: 67 PG/ML (ref 0–100)
BUN SERPL-MCNC: 16 MG/DL (ref 5–25)
CALCIUM SERPL-MCNC: 9.1 MG/DL (ref 8.4–10.2)
CHLORIDE SERPL-SCNC: 103 MMOL/L (ref 96–108)
CHOLEST SERPL-MCNC: 135 MG/DL
CO2 SERPL-SCNC: 27 MMOL/L (ref 21–32)
CREAT SERPL-MCNC: 1.06 MG/DL (ref 0.6–1.3)
ERYTHROCYTE [DISTWIDTH] IN BLOOD BY AUTOMATED COUNT: 13.2 % (ref 11.6–15.1)
GFR SERPL CREATININE-BSD FRML MDRD: 75 ML/MIN/1.73SQ M
GLUCOSE P FAST SERPL-MCNC: 92 MG/DL (ref 65–99)
HCT VFR BLD AUTO: 48.7 % (ref 36.5–49.3)
HDLC SERPL-MCNC: 67 MG/DL
HGB BLD-MCNC: 15.5 G/DL (ref 12–17)
LDLC SERPL CALC-MCNC: 55 MG/DL (ref 0–100)
MCH RBC QN AUTO: 28.4 PG (ref 26.8–34.3)
MCHC RBC AUTO-ENTMCNC: 31.8 G/DL (ref 31.4–37.4)
MCV RBC AUTO: 89 FL (ref 82–98)
NONHDLC SERPL-MCNC: 68 MG/DL
PLATELET # BLD AUTO: 258 THOUSANDS/UL (ref 149–390)
PMV BLD AUTO: 12.2 FL (ref 8.9–12.7)
POTASSIUM SERPL-SCNC: 4.2 MMOL/L (ref 3.5–5.3)
PROT SERPL-MCNC: 6.6 G/DL (ref 6.4–8.4)
PSA SERPL-MCNC: 1.03 NG/ML (ref 0–4)
RBC # BLD AUTO: 5.45 MILLION/UL (ref 3.88–5.62)
SODIUM SERPL-SCNC: 139 MMOL/L (ref 135–147)
TRIGL SERPL-MCNC: 64 MG/DL
WBC # BLD AUTO: 5.31 THOUSAND/UL (ref 4.31–10.16)

## 2024-05-08 PROCEDURE — 83880 ASSAY OF NATRIURETIC PEPTIDE: CPT

## 2024-05-08 PROCEDURE — 85027 COMPLETE CBC AUTOMATED: CPT

## 2024-05-08 PROCEDURE — G0103 PSA SCREENING: HCPCS

## 2024-05-08 PROCEDURE — 80053 COMPREHEN METABOLIC PANEL: CPT

## 2024-05-08 PROCEDURE — 36415 COLL VENOUS BLD VENIPUNCTURE: CPT

## 2024-05-08 PROCEDURE — 80061 LIPID PANEL: CPT

## 2024-05-08 NOTE — PROGRESS NOTES
Consultation - [facility]  Alex Xiong, 1965, MRN: [id number]    5/8/2024        Reason for Consult / Principal Problem:    History of Obstructive Sleep Apnea       Thank you for the opportunity of participating in the evaluation and care of this patient in the Sleep Clinic at [facility].      Subjective:     HPI: Alex Xiong is a 58 y.o. year old male.  He presents for a consultation regarding a history of obstructive sleep apnea.  He reports that he had a sleep study done more than 5 years ago in [place], [place].  He reports that he believes sleep apnea was mild, however, results are not available for review.  He reports that he weighed 185 lbs at the time of the sleep study.  He states that he used CPAP equipment in the past.  He no longer has equipment.  He used a full face mask, but tried others as well.  He had difficulty using the equipment, due to nasal and oral dryness.  He did not have any adjustments made to his equipment.  He reports sudden awakenings from sleep and from naps, due to choking and difficulty breathing.  He has used ambien 5mg for insomnia, as well as a medication he was buying in [place] called Zopiclona 7.5mg.  Currently, he is taking over the counter melatonin at bedtime, but finds that it does not keep him from waking up.  He does not feel refreshed when waking up and takes a 30-45 minute nap most days.  He recently had a consultation with Dr. Perez regarding Inspire, however, he decided against having the Inspire implant at this time.    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    Sleep Schedule:       Bedtime:  11:00pm-midnight, changing to 10:00pm      Latency:  30 minutes or less      Wakeup time:  7:00am, changing to  5:00am    Awakenings:       Frequency:  2 times in the first hour and again later toward morning      Causes:  choking sensation and feeling like he can't breathe, for urination, unknown causes      Duration:  returns to sleep within a few minutes    Daytime Sleepiness / Inappropriate Sleep:       Most severe:  he does not feel refreshed when awakening and feels tired during the day       Naps :  most days, wakes up from naps choking      Time:  afternoons      Duration:  less than one hour     Naps are refreshing in quality      Inappropriate drowsiness / sleep:  he dozes when relaxing but does not feel sleepy when driving    Snoring:  he snores, has been told he makes unusual breathing noises when sleeping and wakes himself choking    Apnea:  he has witnessed apnea    Change in Weight:  he lost 15 lbs since the time of his last sleep study    Restless Leg Syndrome:  no clinical symptoms consistent with this diagnosis     Other Complaints:   No reports of sleep walking, sleep talking, sleep paralysis or hallucinations surrounding sleep.  He does not wake up with headaches.  He does not report bruxism.     Social History:      Caffeine:  red bull once per week, coffee daily - 2-3 cups, latest intake 4:00pm       Tobacco:   reports that he has never smoked. He has never used smokeless tobacco.     E-cig/Vaping:    E-Cigarette/Vaping    E-Cigarette Use Never User       E-Cigarette/Vaping Substances    Nicotine No     THC No     CBD No     Flavoring No          Alcohol:   reports current alcohol use of about 2.0 standard drinks of alcohol per week. 1-2 glasses of wine several times per week      Drugs:   reports no history of drug use.       The review of systems and following portions of the patient's history were reviewed and updated as appropriate: allergies, current medications, past family history, past medical history, past social history, past surgical history and problem list.        Objective:       Vitals:     "05/08/24 1014   BP: 118/79   BP Location: Left arm   Patient Position: Sitting   Cuff Size: Large   Weight: 77.1 kg (170 lb)   Height: 5' 8\" (1.727 m)     Body mass index is 25.85 kg/m².  Neck Circumference: 16  [place] Sleepiness Scale: Total score: 8      Current Outpatient Medications:     atorvastatin (LIPITOR) 20 mg tablet, Take 1 tablet (20 mg total) by mouth daily, Disp: 90 tablet, Rfl: 1    valACYclovir (VALTREX) 500 mg tablet, Take 1 tablet (500 mg total) by mouth 2 (two) times a day as needed (herpes outbreak for 3 days), Disp: 30 tablet, Rfl: 0    Physical Exam  General Appearance:   Alert, cooperative, no distress, appears stated age     Head:   Normocephalic, without obvious abnormality, atraumatic     Eyes:   Conjunctiva/corneas clear          Nose:  Nares normal, septum midline, mucosa normal, no drainage or sinus tenderness           Throat:  Lips, teeth and gums normal; tongue normal in size and midline in position; mucosa moist with low-lying soft palatal tissue, uvula normal, tonsils not visualized, Mallampati class 3       Neck:  Supple, symmetrical, trachea midline, no adenopathy; no thyromegaly noted, no carotid bruit or JVD     Lungs:      Clear to auscultation bilaterally, respirations unlabored     Heart:   Regular rate and rhythm, S1 and S2 normal, no murmur, rub or gallop       Extremities:  Extremities normal, atraumatic, no cyanosis or edema       Skin:  Skin color, texture, turgor normal, no rashes or lesions       Neurologic:  No focal deficits noted.          ASSESSMENT / PLAN     1. Obstructive sleep apnea syndrome  Ambulatory Referral to Sleep Medicine    Diagnostic Sleep Study            Counseling / Coordination of Care  I have spent a total time of 60 minutes on 05/08/24 in caring for this patient including Risks and benefits of tx options, Instructions for management, Patient and family education, Importance of tx compliance, Risk factor reductions, Impressions, Counseling / " Coordination of care, Documenting in the medical record, Reviewing / ordering tests, medicine, procedures  , and Obtaining or reviewing history  .    Today we discussed the anatomy and physiology of the upper airway.  I pointed out how changes in this region can result in both snoring and abnormal breathing events including apneas and hypopneas.  I explained the most common co-morbidities of untreated sleep apnea.  After this we talked about some forms of treatment including application of positive airway pressure, mandibular advancement devices and surgery.  He would consider use of PAP therapy, if SAMMI is confirmed.  He would likely do best to start with a full face mask, if needed.  May be able to transition to a nasal form of mask.    In order to evaluate the possibility of Obstructive Sleep Apnea as a cause of the patient's symptoms, a diagnostic sleep study will be completed to identify the presence or absence of abnormal nocturnal breathing. If significant abnormal nocturnal breathing is detected, nasal CPAP will be titrated to find the optimum pressure needed to maintain upper airway patency during sleep. Following testing, the patient will return to the Sleep Disorders Center for set up of CPAP equipment, followed by a compliance follow up visit 31-91 days after the set up.  The study details will be reviewed in detail at that time.       The following instructions have been given to the patient today:    Patient Instructions   1. Schedule diagnostic sleep study   2.  The nurse will call with results and plan of treatment        Nursing Support:  When: Monday through Friday 7A-5PM except holidays  Where: Our direct line is [phone].    If you are having a true emergency please call 911.  In the event that the line is busy or it is after hours please leave a voice message and we will return your call.  Please speak clearly, leaving your full name, birth date, best number to reach you and the reason for  "your call.   Medication refills: We will need the name of the medication, the dosage, the ordering provider, whether you get a 30 or 90 day refill, and the pharmacy name and address.  Medications will be ordered by the provider only.  Nurses cannot call in prescriptions.  Please allow 7 days for medication refills.  Physician requested updates: If your provider requested that you call with an update after starting medication, please be ready to provide us the medication and dosage, what time you take your medication, the time you attempt to fall asleep, time you fall asleep, when you wake up, and what time you get out of bed.  Sleep Study Results: We will contact you with sleep study results and/or next steps after the physician has reviewed your testing.      LANDRY Avery  [facility]      Portions of the record may have been created with voice recognition software. Occasional wrong word or \"sound a like\" substitutions may have occurred due to the inherent limitations of voice recognition software. Read the chart carefully and recognize, using context, where substitutions have occurred.               "

## 2024-05-08 NOTE — PATIENT INSTRUCTIONS
1. Schedule diagnostic sleep study   2.  The nurse will call with results and plan of treatment        Nursing Support:  When: Monday through Friday 7A-5PM except holidays  Where: Our direct line is [phone].    If you are having a true emergency please call 911.  In the event that the line is busy or it is after hours please leave a voice message and we will return your call.  Please speak clearly, leaving your full name, birth date, best number to reach you and the reason for your call.   Medication refills: We will need the name of the medication, the dosage, the ordering provider, whether you get a 30 or 90 day refill, and the pharmacy name and address.  Medications will be ordered by the provider only.  Nurses cannot call in prescriptions.  Please allow 7 days for medication refills.  Physician requested updates: If your provider requested that you call with an update after starting medication, please be ready to provide us the medication and dosage, what time you take your medication, the time you attempt to fall asleep, time you fall asleep, when you wake up, and what time you get out of bed.  Sleep Study Results: We will contact you with sleep study results and/or next steps after the physician has reviewed your testing.

## 2024-05-09 ENCOUNTER — TELEPHONE (OUTPATIENT)
Age: 59
End: 2024-05-09

## 2024-05-09 DIAGNOSIS — F51.01 PRIMARY INSOMNIA: ICD-10-CM

## 2024-05-09 NOTE — TELEPHONE ENCOUNTER
Pt calling for med refill. Medication pt is requesting is not on med list. Transferred call to PCP pod.

## 2024-05-09 NOTE — TELEPHONE ENCOUNTER
Patient called in to request 1 dose of ambien 5 mg for a sleep study scheduled with [facility] 5/10/24. Pharmacy correct on file.

## 2024-05-10 ENCOUNTER — HOSPITAL ENCOUNTER (OUTPATIENT)
Dept: SLEEP CENTER | Facility: CLINIC | Age: 59
Discharge: HOME | End: 2024-05-10
Payer: COMMERCIAL

## 2024-05-10 ENCOUNTER — APPOINTMENT (OUTPATIENT)
Dept: RADIOLOGY | Facility: MEDICAL CENTER | Age: 59
End: 2024-05-10
Payer: COMMERCIAL

## 2024-05-10 VITALS
WEIGHT: 171 LBS | HEIGHT: 68 IN | DIASTOLIC BLOOD PRESSURE: 84 MMHG | BODY MASS INDEX: 25.91 KG/M2 | SYSTOLIC BLOOD PRESSURE: 145 MMHG | HEART RATE: 48 BPM

## 2024-05-10 DIAGNOSIS — G89.29 CHRONIC PAIN OF LEFT KNEE: ICD-10-CM

## 2024-05-10 DIAGNOSIS — S83.522A CHRONIC RUPTURE OF PCL OF LEFT KNEE: ICD-10-CM

## 2024-05-10 DIAGNOSIS — M25.562 CHRONIC PAIN OF LEFT KNEE: ICD-10-CM

## 2024-05-10 DIAGNOSIS — M25.562 CHRONIC PAIN OF LEFT KNEE: Primary | ICD-10-CM

## 2024-05-10 DIAGNOSIS — M25.362 INSTABILITY OF LEFT KNEE JOINT: ICD-10-CM

## 2024-05-10 DIAGNOSIS — G89.29 CHRONIC PAIN OF LEFT KNEE: Primary | ICD-10-CM

## 2024-05-10 DIAGNOSIS — G47.33 OBSTRUCTIVE SLEEP APNEA SYNDROME: ICD-10-CM

## 2024-05-10 PROCEDURE — 99204 OFFICE O/P NEW MOD 45 MIN: CPT | Performed by: EMERGENCY MEDICINE

## 2024-05-10 PROCEDURE — 95810 POLYSOM 6/> YRS 4/> PARAM: CPT

## 2024-05-10 PROCEDURE — 73564 X-RAY EXAM KNEE 4 OR MORE: CPT

## 2024-05-10 RX ORDER — ZOLPIDEM TARTRATE 5 MG/1
5 TABLET ORAL
Qty: 30 TABLET | Refills: 0 | Status: SHIPPED | OUTPATIENT
Start: 2024-05-10

## 2024-05-10 NOTE — PROGRESS NOTES
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  I agree with obtaining an MRI as the patient does have laxity on exam and positive symptoms of instability.  Based on the mechanism I believe he has a ruptured PCL.  We discussed conservative and surgical treatment options.  At this point in time we will hold off on bracing pending MRI results.  X-ray of the left knee was obtained today and reviewed showing mild degenerative changes also possible chronic Stieda fracture of the medial femoral condyle.  Neg for effusion    Return for Follow Up After Imaging Study.      Subjective:   Patient ID: Alex Xiong is a 58 y.o. male.    NP presents for chronic Left knee pain and mostly instability since an injury 2 year ago while practicing [employer] states he landed on a flexed knee.  Since that time he has been experiencing the symptoms of instability.  He was evaluated by his PCP who ordered an MRI of the knee.  Patient does exercise walking on the treadmill however he would like to possibly get back to running or jogging on the treadmill for which she cannot do due to his symptoms.      Review of Systems    The following portions of the patient's chart were reviewed and updated as appropriate:   Allergy:  No Known Allergies    Medications:    Current Outpatient Medications:     atorvastatin (LIPITOR) 20 mg tablet, Take 1 tablet (20 mg total) by mouth daily, Disp: 90 tablet, Rfl: 1    valACYclovir (VALTREX) 500 mg tablet, Take 1 tablet (500 mg total) by mouth 2 (two) times a day as needed (herpes outbreak for 3 days), Disp: 30 tablet, Rfl: 0    zolpidem (AMBIEN) 5 mg tablet, TAKE 1  "TABLET BY MOUTH DAILY AT BEDTIME AS NEEDED FOR SLEEP, Disp: 30 tablet, Rfl: 0    Patient Active Problem List   Diagnosis    Primary insomnia    Sleep apnea    Mixed hyperlipidemia    Genital herpes simplex    Family history of pancreatic cancer    Chronic pain of left knee    Instability of left knee joint    Chest pain       Objective:  /84   Pulse (!) 48   Ht 5' 8\" (1.727 m)   Wt 77.6 kg (171 lb)   BMI 26.00 kg/m²     Left Knee Exam     Tenderness   The patient is experiencing no tenderness.     Range of Motion   The patient has normal left knee ROM.    Tests   Lachman:      Posterior - positive  Drawer:       Posterior - positive  Patellar apprehension: negative    Other   Erythema: absent  Sensation: normal            Physical Exam      Neurologic Exam    Procedures    I            Past Medical History:   Diagnosis Date    GERD (gastroesophageal reflux disease)     Hypertension     Sleep apnea        History reviewed. No pertinent surgical history.    Social History     Socioeconomic History    Marital status: Single     Spouse name: Not on file    Number of children: Not on file    Years of education: Not on file    Highest education level: Not on file   Occupational History    Not on file   Tobacco Use    Smoking status: Never    Smokeless tobacco: Never   Vaping Use    Vaping status: Never Used   Substance and Sexual Activity    Alcohol use: Yes     Alcohol/week: 2.0 standard drinks of alcohol     Types: 2 Glasses of wine per week     Comment: occasional    Drug use: Never    Sexual activity: Not Currently     Partners: Female   Other Topics Concern    Not on file   Social History Narrative    Not on file     Social Determinants of Health     Financial Resource Strain: Not on file   Food Insecurity: Not on file   Transportation Needs: Not on file   Physical Activity: Not on file   Stress: Not on file   Social Connections: Not on file   Intimate Partner Violence: Not on file   Housing Stability: Not " on file       Family History   Problem Relation Age of Onset    Pancreatic cancer Mother 62    Diabetes Mother     Heart attack Father 76    Hypertension Father     Hypertension Sister

## 2024-05-10 NOTE — TELEPHONE ENCOUNTER
Patient called requesting refill for zolpidemPatient made aware medication was refilled on 05/10/2024 for 30 with 0 refills to [facility] pharmacy. Patient instructed to contact the pharmacy to obtain refills of medication. Patient verbalized understanding.

## 2024-05-10 NOTE — TELEPHONE ENCOUNTER
I will defer to sleep medicine in regards to this request.  Typically sedatives are prohibited for sleep studies.

## 2024-05-11 PROBLEM — G47.33 OSA (OBSTRUCTIVE SLEEP APNEA): Status: ACTIVE | Noted: 2022-03-18

## 2024-05-11 NOTE — PROGRESS NOTES
Sleep Study Documentation    Pre-Sleep Study       Sleep testing procedure explained to patient:YES    Patient napped prior to study:NO    Caffeine:Dayshift worker after 12PM.  Caffeine use:NO    Alcohol:Dayshift workers after 5PM: Alcohol use:YES-Wine 2 to 3 servings    Typical day for patient:YES       Study Documentation    Sleep Study Indications: SAMMI ,EDS    Sleep Study: Diagnostic   Snore:Mild  Minimum SaO2 80%  Baseline SaO2 93%      EKG abnormalities: yes:  EPOCH example and comments: Bradycardia    EEG abnormalities: no    Were abnormal behaviors in sleep observed:NO    Is Total Sleep Study Recording Time < 2 hours: N/A    Is Total Sleep Study Recording Time > 2 hours but study is incomplete: N/A    Is Total Sleep Study Recording Time 6 hours or more but sleep was not obtained: NO    Patient classification: employed       Post-Sleep Study    Medication used at bedtime or during sleep study:YES prescription sleep aid    Patient reports time it took to fall asleep:less than 20 minutes    Patient reports waking up during study:1 to 2 times.  Patient reports returning to sleep without difficulty.    Patient reports sleeping 4 to 6 hours without dreaming.    Does the Patient feel this is a typical night of sleep:better than usual    Patient rated sleepiness: Very sleepy or tired    PAP treatment:no.

## 2024-05-15 DIAGNOSIS — A60.00 GENITAL HERPES SIMPLEX, UNSPECIFIED SITE: ICD-10-CM

## 2024-05-15 PROCEDURE — 95806 SLEEP STUDY UNATT&RESP EFFT: CPT | Performed by: PSYCHIATRY & NEUROLOGY

## 2024-05-16 RX ORDER — VALACYCLOVIR HYDROCHLORIDE 500 MG/1
500 TABLET, FILM COATED ORAL 2 TIMES DAILY PRN
Qty: 180 TABLET | Refills: 1 | OUTPATIENT
Start: 2024-05-16 | End: 2024-07-15

## 2024-05-19 DIAGNOSIS — F51.01 PRIMARY INSOMNIA: ICD-10-CM

## 2024-05-19 DIAGNOSIS — G47.33 OSA (OBSTRUCTIVE SLEEP APNEA): Primary | ICD-10-CM

## 2024-05-25 ENCOUNTER — HOSPITAL ENCOUNTER (OUTPATIENT)
Facility: MEDICAL CENTER | Age: 59
Discharge: HOME | End: 2024-05-25
Payer: COMMERCIAL

## 2024-05-25 DIAGNOSIS — M25.362 INSTABILITY OF LEFT KNEE JOINT: ICD-10-CM

## 2024-05-25 DIAGNOSIS — G89.29 CHRONIC PAIN OF LEFT KNEE: ICD-10-CM

## 2024-05-25 DIAGNOSIS — M25.562 CHRONIC PAIN OF LEFT KNEE: ICD-10-CM

## 2024-05-25 PROCEDURE — 73721 MRI JNT OF LWR EXTRE W/O DYE: CPT

## 2024-05-30 ENCOUNTER — TELEPHONE (OUTPATIENT)
Dept: INTERNAL MEDICINE CLINIC | Facility: OTHER | Age: 59
End: 2024-05-30

## 2024-05-30 DIAGNOSIS — R07.9 CHEST PAIN, UNSPECIFIED TYPE: Primary | ICD-10-CM

## 2024-05-30 DIAGNOSIS — R60.0 BILATERAL LEG EDEMA: ICD-10-CM

## 2024-05-30 NOTE — TELEPHONE ENCOUNTER
Melanie V[facility]    The prior authorization request for this study has not been approved. You can schedule a peer to peer by calling [employer] at phone # [phone] with the deadline date of 5/31/2024. CASE#[id number]. Provider used on auth: Dr. Law Thomas  NPI #[id number] The insurance determined the following:     [X] Does not meet Medical Necessity  [ ] No prior imaging  [ ] PT or conservative treatment incomplete  [ ] Missing Labs  [ ] Frequency     Denial Rationale:    This letter is to notify you that [employer] will not cover the service requested because it does not  meet medical necessity criteria. Below is the clinical rationale for our denial. T     CLINICALS UPLOADED:    OFFICE NOTE AND ORDER.      Melanie

## 2024-06-03 NOTE — TELEPHONE ENCOUNTER
Patient stopped by the office stating he was supposed to have an Echo stress test done today but he informed me that his insurance denied it because a letter was sent out for more information but looks like nothing was received/scanned into his chart. Please advise as patient is wondering what the next steps are

## 2024-06-04 ENCOUNTER — TELEPHONE (OUTPATIENT)
Dept: SLEEP CENTER | Facility: CLINIC | Age: 59
End: 2024-06-04

## 2024-06-04 NOTE — TELEPHONE ENCOUNTER
Called patient and advised of sleep study results and order for CPAP.     Patient agreeable to use [facility] as DME provider.  Advised patient that the CPAP Rx will be sent to [facility] and one of their representatives should reach out in 7-10 days to schedule a set up appointment in the office.  Provided patient with the phone number to [facility]'s sleep department([phone]) to call to follow up if not contacted by [facility].      Notified office staff via Teams DME chat to process CPAP Rx.     Scheduled compliance follow up 1/8/25.  Added to wait list.

## 2024-06-04 NOTE — TELEPHONE ENCOUNTER
----- Message from LANDRY Avery sent at 5/19/2024  9:52 PM EDT -----  Mild obstructive sleep apnea was confirmed during the home sleep study.  Due to symptoms and comorbid HTN, use of CPAP equipment is recommended. A prescription for equipment has been provided.  Patient to be scheduled for set up of equipment with compliance follow up.  If having any difficulty using the equipment, a CPAP titration study may be needed, due to past difficulty with use of CPAP.

## 2024-06-04 NOTE — TELEPHONE ENCOUNTER
Patient called in requesting to speak with Marta, clinical team was unavailable at the time of assistance of call.     Patient was hoping the insurance company gets this information as soon as possible, as patient is experiencing chest pains still, and would like to get the echo completed as soon as possible.    Please advise back to patient once information has been sent over to insurance company, or for any additional information needed.

## 2024-06-07 ENCOUNTER — TELEPHONE (OUTPATIENT)
Dept: SLEEP CENTER | Facility: CLINIC | Age: 59
End: 2024-06-07

## 2024-07-19 ENCOUNTER — HOSPITAL ENCOUNTER (OUTPATIENT)
Dept: NON INVASIVE DIAGNOSTICS | Facility: CLINIC | Age: 59
Discharge: HOME | End: 2024-07-19

## 2024-09-04 ENCOUNTER — TELEPHONE (OUTPATIENT)
Age: 59
End: 2024-09-04

## 2024-09-04 NOTE — TELEPHONE ENCOUNTER
Sonya from [employer] stated pt need appeal for echo and stress test. Sonya stated if  calls [phone] opt 4 would process faster. Pt's appts next weeks

## 2024-09-05 NOTE — TELEPHONE ENCOUNTER
Patient was calling in to know the status of his auth team, Do notice that this request was sent over to the central prior auth team.     Is there an update that Genevieve might know of at this moment? Nothing is scanned in patient charts, he was advised to call back in today 09/05/2024, to get any confirmation on those auth request.

## 2024-09-06 NOTE — TELEPHONE ENCOUNTER
Patient called inquiring about his auth for stress and echo - informed patient that they have both been approved. NFA.

## 2024-09-12 ENCOUNTER — TELEPHONE (OUTPATIENT)
Age: 59
End: 2024-09-12

## 2024-09-12 ENCOUNTER — HOSPITAL ENCOUNTER (OUTPATIENT)
Dept: NON INVASIVE DIAGNOSTICS | Facility: HOSPITAL | Age: 59
Discharge: HOME | End: 2024-09-12
Payer: COMMERCIAL

## 2024-09-12 ENCOUNTER — HOSPITAL ENCOUNTER (OUTPATIENT)
Dept: NON INVASIVE DIAGNOSTICS | Facility: HOSPITAL | Age: 59
Discharge: HOME | End: 2024-09-12
Attending: INTERNAL MEDICINE
Payer: COMMERCIAL

## 2024-09-12 VITALS
HEIGHT: 68 IN | WEIGHT: 171 LBS | SYSTOLIC BLOOD PRESSURE: 118 MMHG | OXYGEN SATURATION: 99 % | DIASTOLIC BLOOD PRESSURE: 76 MMHG | HEART RATE: 64 BPM | BODY MASS INDEX: 25.91 KG/M2

## 2024-09-12 VITALS
WEIGHT: 171.08 LBS | DIASTOLIC BLOOD PRESSURE: 76 MMHG | HEIGHT: 68 IN | SYSTOLIC BLOOD PRESSURE: 118 MMHG | HEART RATE: 64 BPM | BODY MASS INDEX: 25.93 KG/M2

## 2024-09-12 DIAGNOSIS — F51.01 PRIMARY INSOMNIA: ICD-10-CM

## 2024-09-12 DIAGNOSIS — R07.9 CHEST PAIN, UNSPECIFIED TYPE: ICD-10-CM

## 2024-09-12 DIAGNOSIS — R60.0 BILATERAL LEG EDEMA: ICD-10-CM

## 2024-09-12 LAB
AORTIC ROOT: 3.7 CM
APICAL FOUR CHAMBER EJECTION FRACTION: 55 %
ASCENDING AORTA: 2.9 CM
BSA FOR ECHO PROCEDURE: 1.91 M2
FRACTIONAL SHORTENING: 27 (ref 28–44)
INTERVENTRICULAR SEPTUM IN DIASTOLE (PARASTERNAL SHORT AXIS VIEW): 1 CM
INTERVENTRICULAR SEPTUM: 1 CM (ref 0.6–1.1)
LAAS-AP2: 20.3 CM2
LAAS-AP4: 19.7 CM2
LEFT ATRIUM SIZE: 4.3 CM
LEFT ATRIUM VOLUME (MOD BIPLANE): 59 ML
LEFT ATRIUM VOLUME INDEX (MOD BIPLANE): 30.9 ML/M2
LEFT INTERNAL DIMENSION IN SYSTOLE: 3.3 CM (ref 2.1–4)
LEFT VENTRICULAR INTERNAL DIMENSION IN DIASTOLE: 4.5 CM (ref 3.5–6)
LEFT VENTRICULAR POSTERIOR WALL IN END DIASTOLE: 1.1 CM
LEFT VENTRICULAR STROKE VOLUME: 47 ML
LVSV (TEICH): 47 ML
MAX HR PERCENT: 96 %
MAX HR: 155 BPM
MV E'TISSUE VEL-SEP: 9 CM/S
RA PRESSURE ESTIMATED: 3 MMHG
RATE PRESSURE PRODUCT: NORMAL
RIGHT ATRIAL 2D VOLUME: 38 ML
RIGHT ATRIUM AREA SYSTOLE A4C: 14.8 CM2
RIGHT VENTRICLE ID DIMENSION: 3.9 CM
RV PSP: 21 MMHG
SINUS: 3.7 CM
SL CV LEFT ATRIUM LENGTH A2C: 5.2 CM
SL CV LV EF: 55
SL CV PED ECHO LEFT VENTRICLE DIASTOLIC VOLUME (MOD BIPLANE) 2D: 91 ML
SL CV PED ECHO LEFT VENTRICLE SYSTOLIC VOLUME (MOD BIPLANE) 2D: 44 ML
SL CV STRESS RECOVERY BP: NORMAL MMHG
SL CV STRESS RECOVERY HR: 90 BPM
SL CV STRESS RECOVERY O2 SAT: 100 %
SL CV STRESS STAGE REACHED: 4
STRESS ANGINA INDEX: 0
STRESS BASELINE BP: NORMAL MMHG
STRESS BASELINE HR: 64 BPM
STRESS DUKE TREADMILL SCORE: 12
STRESS O2 SAT REST: 99 %
STRESS PEAK HR: 136 BPM
STRESS POST ESTIMATED WORKLOAD: 13.4 METS
STRESS POST EXERCISE DUR MIN: 12 MIN
STRESS POST EXERCISE DUR SEC: 0 SEC
STRESS POST O2 SAT PEAK: 98 %
STRESS POST PEAK BP: 178 MMHG
STRESS ST ELEVATION: 0 MM
TR MAX PG: 18 MMHG
TR PEAK VELOCITY: 2.1 M/S
TRICUSPID ANNULAR PLANE SYSTOLIC EXCURSION: 2.1 CM
TRICUSPID VALVE PEAK REGURGITATION VELOCITY: 2.13 M/S

## 2024-09-12 PROCEDURE — 93306 TTE W/DOPPLER COMPLETE: CPT

## 2024-09-12 PROCEDURE — 93018 CV STRESS TEST I&R ONLY: CPT | Performed by: INTERNAL MEDICINE

## 2024-09-12 PROCEDURE — 93306 TTE W/DOPPLER COMPLETE: CPT | Performed by: INTERNAL MEDICINE

## 2024-09-12 PROCEDURE — 93016 CV STRESS TEST SUPVJ ONLY: CPT | Performed by: INTERNAL MEDICINE

## 2024-09-12 PROCEDURE — 93017 CV STRESS TEST TRACING ONLY: CPT

## 2024-09-12 RX ORDER — ZOLPIDEM TARTRATE 5 MG/1
5 TABLET ORAL
Qty: 30 TABLET | Refills: 0 | Status: SHIPPED | OUTPATIENT
Start: 2024-09-12

## 2024-09-12 NOTE — TELEPHONE ENCOUNTER
Patient called requesting refill for zolpidem (AMBIEN) 5 mg tablet. Patient made aware medication was refilled on 09/12/2024  for 30 with 0 refills to [facility]/pharmacy #[id number] - [place], [place] - [address]  pharmacy. Patient instructed to contact the pharmacy to obtain refills of medication. Patient verbalized understanding.

## 2024-09-17 ENCOUNTER — TELEPHONE (OUTPATIENT)
Dept: INTERNAL MEDICINE CLINIC | Facility: OTHER | Age: 59
End: 2024-09-17

## 2024-09-17 LAB
CHEST PAIN STATEMENT: NORMAL
MAX DIASTOLIC BP: 72 MMHG
MAX PREDICTED HEART RATE: 160 BPM
PROTOCOL NAME: NORMAL
REASON FOR TERMINATION: NORMAL
STRESS POST EXERCISE DUR MIN: 12 MIN
STRESS POST EXERCISE DUR SEC: 0 SEC
STRESS POST PEAK HR: 155 BPM
STRESS POST PEAK SYSTOLIC BP: 178 MMHG
TARGET HR FORMULA: NORMAL
TEST INDICATION: NORMAL

## 2024-09-19 NOTE — TELEPHONE ENCOUNTER
Did you read the message from the doctor to him? Why did he have this done now it was ordered back in May? Any chest pain or symptoms?

## 2024-09-19 NOTE — TELEPHONE ENCOUNTER
Patient returned  call aware of result. Pt requesting Echo results.      Please review and advise.  Thank you

## 2025-02-13 ENCOUNTER — TELEPHONE (OUTPATIENT)
Dept: INTERNAL MEDICINE CLINIC | Facility: OTHER | Age: 60
End: 2025-02-13